# Patient Record
Sex: MALE | Race: WHITE
[De-identification: names, ages, dates, MRNs, and addresses within clinical notes are randomized per-mention and may not be internally consistent; named-entity substitution may affect disease eponyms.]

---

## 2018-06-19 ENCOUNTER — HOSPITAL ENCOUNTER (EMERGENCY)
Dept: HOSPITAL 25 - UCCORT | Age: 33
Discharge: HOME | End: 2018-06-19
Payer: COMMERCIAL

## 2018-06-19 VITALS — SYSTOLIC BLOOD PRESSURE: 127 MMHG | DIASTOLIC BLOOD PRESSURE: 87 MMHG
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
## 2018-06-19 DIAGNOSIS — S62.337A: Primary | ICD-10-CM
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
## 2018-06-19 DIAGNOSIS — Z88.0: ICD-10-CM

## 2018-06-19 DIAGNOSIS — Z88.8: ICD-10-CM
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
## 2018-06-19 PROCEDURE — G0463 HOSPITAL OUTPT CLINIC VISIT: HCPCS

## 2018-06-19 PROCEDURE — 99211 OFF/OP EST MAY X REQ PHY/QHP: CPT

## 2018-06-19 NOTE — RAD
INDICATION: Left hand injury. 



TECHNIQUE: 4 views of the left hand were obtained.



FINDINGS:  There is a transverse fracture of the distal metaphysis of the fifth metacarpal

with anterior angulation of the distal fragment relative to the proximal fragment

consistent with a boxer's fracture. No other fractures are seen.  Joint spaces appear

maintained.



IMPRESSION:  ANGULATED FRACTURE OF THE DISTAL FIFTH METACARPAL.

## 2018-06-19 NOTE — UC
Hand/Wrist HPI





- HPI Summary


HPI Summary: 





left hand pain x 1 days


punched the door this morning 


+ pain and swelling of the left hand 








- History Of Current Complaint


Chief Complaint: UCUpperExtremity


Stated Complaint: LFT HAND INJURY


Time Seen by Provider: 06/19/18 08:40


Hx Obtained From: Patient


Onset/Duration: Sudden Onset, Lasting Hours - 2, Still Present


Severity Initially: Severe


Severity Currently: Moderate


Pain Intensity: 6


Character Of Pain: Aching, Throbbing


Aggravating Factor(s): Movement, Lifting


Alleviating Factor(s): Rest, Ice, OTC Meds - ibuprofen


Associated Signs And Symptoms: Positive: Swelling, Weakness.  Negative: Redness

, Bruising, Fever, Numbness/Tingling





- Allergies/Home Medications


Allergies/Adverse Reactions: 


 Allergies











Allergy/AdvReac Type Severity Reaction Status Date / Time


 


amoxicillin [From Augmentin] Allergy  Rash Verified 06/19/18 08:36


 


clavulanic acid Allergy  Rash Verified 06/19/18 08:36





[From Augmentin]     











Home Medications: 


 Home Medications





Ibuprofen TAB* [Advil TAB*] 600 mg PO Q6H PRN 06/19/18 [History Confirmed 06/19/ 18]











PMH/Surg Hx/FS Hx/Imm Hx


Previously Healthy: Yes





- Surgical History


Surgical History: None





- Family History


Known Family History: Positive: Hypertension, Other - kidney stones





- Social History


Alcohol Use: Occasionally


Substance Use Type: None


Smoking Status (MU): Never Smoked Tobacco





Review of Systems


Constitutional: Negative


Skin: Negative


Eyes: Negative


ENT: Negative


Respiratory: Negative


Is Patient Immunocompromised?: No


All Other Systems Reviewed And Are Negative: Yes





Physical Exam


Triage Information Reviewed: Yes


Appearance: Well-Appearing, No Pain Distress, Well-Nourished


Vital Signs: 


 Initial Vital Signs











Temp  98.1 F   06/19/18 08:26


 


Pulse  61   06/19/18 08:26


 


Resp  14   06/19/18 08:26


 


BP  127/87   06/19/18 08:26


 


Pulse Ox  99   06/19/18 08:26











Vital Signs Reviewed: Yes


Eyes: Positive: Conjunctiva Clear


ENT: Positive: Normal ENT inspection, Hearing grossly normal, Pharynx normal


Neck exam: Normal


Neck: Positive: Supple, Nontender, No Lymphadenopathy


Respiratory: Positive: Chest non-tender, Lungs clear, Normal breath sounds


Cardiovascular: Positive: RRR, No Murmur, Pulses Normal


Musculoskeletal: Positive: Other: - left hand : + swelling , no ecchymosis, + 

tenderness left 5th metacarpal , limited ROM on flexion , limited strength





Diagnostics





- Laboratory


Diagnostic Studies Completed/Ordered: left hand xray:  IMPRESSION: ANGULATED 

FRACTURE OF THE DISTAL FIFTH METACARPAL.





Hand/Wrist Course/Dx





- Differential Dx/Diagnosis


Provider Diagnoses: Boxer fracture left hand





Discharge





- Sign-Out/Discharge


Documenting (check all that apply): Discharge/Admit/Transfer





- Discharge Plan


Condition: Stable


Disposition: HOME


Patient Education Materials:  Boxer Fracture (ED)


Referrals: 


Dre Olivarez MD [Medical Doctor] - As Soon As Possible


No Primary Care Phys,NOPCP [Primary Care Provider] - 





- Billing Disposition and Condition


Condition: STABLE


Disposition: Home

## 2019-01-03 ENCOUNTER — HOSPITAL ENCOUNTER (EMERGENCY)
Dept: HOSPITAL 25 - UCCORT | Age: 34
Discharge: HOME | End: 2019-01-03
Payer: COMMERCIAL

## 2019-01-03 VITALS — SYSTOLIC BLOOD PRESSURE: 121 MMHG | DIASTOLIC BLOOD PRESSURE: 77 MMHG

## 2019-01-03 DIAGNOSIS — M10.9: Primary | ICD-10-CM

## 2019-01-03 DIAGNOSIS — Z88.8: ICD-10-CM

## 2019-01-03 DIAGNOSIS — I10: ICD-10-CM

## 2019-01-03 DIAGNOSIS — Z88.2: ICD-10-CM

## 2019-01-03 PROCEDURE — G0463 HOSPITAL OUTPT CLINIC VISIT: HCPCS

## 2019-01-03 PROCEDURE — 99212 OFFICE O/P EST SF 10 MIN: CPT

## 2019-01-03 NOTE — ED
Lower Extremity





- HPI Summary


HPI Summary: 





33 yr old male with a personal history of gout and family history of gout 

presents here with right foot 1st MP joint swelling, redness and pain.  Onset 3 

days ago.  He has had some indulgence in the holiday food and ETOH.  Complains 

of pain, redness, and symptoms that are moderate.  He feels like this is a 

prior gout flare.  No fever.  he denies trauma or injury. 





- History of Current Complaint


Chief Complaint: UCLowerExtremity


Stated Complaint: RIGHT FOOT COMPLAINT


Time Seen by Provider: 01/03/19 08:52


Pain Intensity: 6





- Allergies/Home Medications


Allergies/Adverse Reactions: 


 Allergies











Allergy/AdvReac Type Severity Reaction Status Date / Time


 


amoxicillin [From Augmentin] Allergy  Rash Verified 01/03/19 08:33


 


clavulanic acid Allergy  Rash Verified 01/03/19 08:33





[From Augmentin]     














PMH/Surg Hx/FS Hx/Imm Hx


Endocrine/Hematology History: 


   Denies: Hx Diabetes, Hx Thyroid Disease


Cardiovascular History: 


   Denies: Hx Hypertension


Respiratory History: 


   Denies: Hx Asthma, Hx Chronic Obstructive Pulmonary Disease (COPD)


GI History: 


   Denies: Hx Ulcer


 History: Reports: Hx Kidney Stones


Infectious Disease History: No


Infectious Disease History: 


   Denies: Hx Hepatitis, Hx Human Immunodeficiency Virus (HIV), Traveled 

Outside the [place] in Last 30 Days





- Family History


Known Family History: Positive: Hypertension, Other - kidney stones





- Social History


Alcohol Use: Rare


Substance Use Type: Reports: None


Smoking Status (MU): Never Smoked Tobacco





Review of Systems


Constitutional: Negative


Positive: Other - pain swelling right first MP


All Other Systems Reviewed And Are Negative: Yes





Physical Exam


Triage Information Reviewed: Yes


Vital Signs On Initial Exam: 


 Initial Vitals











Temp Pulse Resp BP Pulse Ox


 


 97.2 F   78   16   121/77   99 


 


 01/03/19 08:34  01/03/19 08:34  01/03/19 08:34  01/03/19 08:34  01/03/19 08:34











Vital Signs Reviewed: Yes


Appearance: Positive: Well-Appearing, No Pain Distress


Skin: Positive: Warm, Skin Color Reflects Adequate Perfusion


Head/Face: Positive: Normal Head/Face Inspection


Eyes: Positive: EOMI


ENT: Positive: Normal ENT inspection


Neck: Positive: Nontender


Respiratory/Lung Sounds: Positive: Clear to Auscultation, Breath Sounds Present


Cardiovascular: Positive: RRR.  Negative: Pulses are Symmetrical in both Upper 

and Lower Extremities


Abdomen Description: Positive: Nontender


Musculoskeletal: Positive: Other - there is mild edema and obvious tenderness 

to the right 1st MP joint foot.  Slight erythema to the same area.  No deformity

, no bruise.  The pulses are intact in the DP and PT area.


Neurological: Positive: Sensory/Motor Intact, Alert, Oriented to Person Place, 

Time, CN Intact II-III


Psychiatric: Positive: Normal





- [place] Coma Scale


Best Eye Response: 4 - Spontaneous


Best Motor Response: 6 - Obeys Commands


Best Verbal Response: 5 - Oriented


Coma Scale Total: 15





Diagnostics





- Vital Signs


 Vital Signs











  Temp Pulse Resp BP Pulse Ox


 


 01/03/19 08:34  97.2 F  78  16  121/77  99














- Laboratory


Lab Statement: Any lab studies that have been ordered have been reviewed, and 

results considered in the medical decision making process.





Lower Extremity Course/Dx





- Course


Course Of Treatment: 33 yr old with gout flare.  DC home in stable condition on 

indocin.  FU with primary referral.





- Diagnoses


Provider Diagnoses: 


 Gout attack








Discharge





- Sign-Out/Discharge


Documenting (check all that apply): Patient Departure


All imaging exams completed and their final reports reviewed: No Studies





- Discharge Plan


Condition: Good


Disposition: HOME


Prescriptions: 


Indomethacin CAP* [Indocin CAP*] 50 mg PO TID PRN #21 cap


 PRN Reason: Pain


Patient Education Materials:  Gout (ED)


Forms:  *Work Release


Referrals: 


No Primary Care Phys,NOPCP [Primary Care Provider] - 


[facility] PHYSICIAN REFERRAL [Outside] - 2 Days





- Billing Disposition and Condition


Condition: GOOD


Disposition: Home

## 2019-01-07 ENCOUNTER — HOSPITAL ENCOUNTER (EMERGENCY)
Dept: HOSPITAL 25 - UCCORT | Age: 34
Discharge: ANOTHER HOSPITAL | End: 2019-01-07
Payer: COMMERCIAL

## 2019-01-07 VITALS — SYSTOLIC BLOOD PRESSURE: 160 MMHG | DIASTOLIC BLOOD PRESSURE: 77 MMHG

## 2019-01-07 DIAGNOSIS — R42: Primary | ICD-10-CM

## 2019-01-07 DIAGNOSIS — Z88.0: ICD-10-CM

## 2019-01-07 DIAGNOSIS — Z88.8: ICD-10-CM

## 2019-01-07 DIAGNOSIS — R55: ICD-10-CM

## 2019-01-07 PROCEDURE — G0463 HOSPITAL OUTPT CLINIC VISIT: HCPCS

## 2019-01-07 PROCEDURE — 93005 ELECTROCARDIOGRAM TRACING: CPT

## 2019-01-07 PROCEDURE — 99213 OFFICE O/P EST LOW 20 MIN: CPT

## 2019-01-07 NOTE — UC
Dizzy HPI


HPI Summary: 


Onset of dizziness and feeling of presyncope that started today while at work.  

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  Has had a head cold 

for about a month he says but denies any cough, fever, nausea/vomiting.  Is 

currently taking indomethacin for gout.





- History Of Current Complaint


Stated Complaint: DIZZINESS


Time Seen by Provider: 01/07/19 12:16


Hx Obtained From: Patient


Onset/Duration: Sudden Onset, Lasting Hours, Still Present


Timing: Constant


Pain Intensity: 0





- Allergies/Home Medications


Allergies/Adverse Reactions: 


 Allergies











Allergy/AdvReac Type Severity Reaction Status Date / Time


 


amoxicillin [From Augmentin] Allergy  Rash Verified 01/07/19 12:16


 


clavulanic acid Allergy  Rash Verified 01/07/19 12:16





[From Augmentin]     














PMH/Surg Hx/FS Hx/Imm Hx


Previously Healthy: Yes





- Surgical History


Surgical History: None





- Family History


Known Family History: Positive: Hypertension, Other - kidney stones





- Social History


Alcohol Use: Rare


Substance Use Type: None


Smoking Status (MU): Never Smoked Tobacco





Review of Systems


All Other Systems Reviewed And Are Negative: Yes


Constitutional: Positive: Negative


Skin: Positive: Negative


ENT: Positive: Negative


Respiratory: Positive: Negative


Cardiovascular: Positive: Negative


Gastrointestinal: Positive: Negative


Neurological: Positive: Other - DIZZY, PRESYNCOPAL





Physical Exam


Triage Information Reviewed: Yes


Appearance: No Pain Distress, Well-Nourished, Ill-Appearing - PT SEEMS 

LISTLESS. NOT MAKING EYE CONTACT


Vital Signs: 


 Initial Vital Signs











Temp  97.1 F   01/07/19 12:17


 


Pulse  79   01/07/19 12:17


 


Resp  15   01/07/19 12:17


 


BP  156/97   01/07/19 12:17


 


Pulse Ox  99   01/07/19 12:17








 Laboratory Tests











  01/07/19





  12:38


 


Influenza A (Rapid)  Negative


 


Influenza B (Rapid)  Negative











Vital Signs Reviewed: Yes


Eyes: Positive: Conjunctiva Clear


ENT: Positive: Hearing grossly normal, Pharynx normal, TMs normal


Neck: Positive: Supple, Nontender, No Lymphadenopathy


Respiratory Exam: Normal


Cardiovascular Exam: Normal


Abdomen Description: Positive: Nontender, Soft


Bowel Sounds: Positive: Present


Musculoskeletal: Positive: No Edema


Neurological: Positive: Alert


Psychological: Positive: Age Appropriate Behavior, Other: - FLAT AFFECT


Skin: Negative: Rashes





Diagnostics





- EKG


Cardiac Rate: NL - 75BPM


Cardiac Rhythm: Sinus: Normal - SINUS ARRHYTHMIA


Ectopy: None


ST Segment: Normal





Dizzy Course/Dx





- Course


Course Of Treatment: FLU NEG. EKG UNREMARKABLE. PT WITH PERSISTENT DIZZINESS 

AND FEELING OF PRESYNCOPE. WILL SEND TO [facility] ED BY AMBULANCE.





- Differential Dx/Diagnosis


Provider Diagnosis: 


 Dizzy, Pre-syncope








- Physician Notifications


Discussed Patient Care With: Chacha Iqbal


Time Discussed With Above Provider: 12:50


Instructed by Provider To: MD Will See In ED





Discharge





- Sign-Out/Discharge


Documenting (check all that apply): Patient Departure


All imaging exams completed and their final reports reviewed: No Studies





- Discharge Plan


Condition: Stable


Disposition: TRANS HIGHER LVL OF CARE FAC


Referrals: 


No Primary Care Phys,NOPCP [Primary Care Provider] - 





- Billing Disposition and Condition


Condition: STABLE


Disposition: Trans Higher Lvl of Care Fac

## 2019-09-19 ENCOUNTER — HOSPITAL ENCOUNTER (EMERGENCY)
Dept: HOSPITAL 25 - UCCORT | Age: 34
Discharge: HOME | End: 2019-09-19
Payer: COMMERCIAL

## 2019-09-19 VITALS — DIASTOLIC BLOOD PRESSURE: 72 MMHG | SYSTOLIC BLOOD PRESSURE: 127 MMHG

## 2019-09-19 DIAGNOSIS — Z88.0: ICD-10-CM

## 2019-09-19 DIAGNOSIS — H66.92: Primary | ICD-10-CM

## 2019-09-19 PROCEDURE — G0463 HOSPITAL OUTPT CLINIC VISIT: HCPCS

## 2019-09-19 PROCEDURE — 99212 OFFICE O/P EST SF 10 MIN: CPT

## 2019-09-19 NOTE — XMS REPORT
Continuity of Care Document (CCD)

 Created on:2019



Patient:Ivan Hicks

Sex:Male

:1985

External Reference #:[id number]





Demographics







 Address  [address] [place] [address]

 

 Home Phone  [phone]

 

 Mobile Phone  [phone]

 

 Email Address  [email or web address]

 

 Preferred Language  en

 

 Marital Status  Not  or 

 

 Restorationism Affiliation  Unknown

 

 Race  White

 

 Ethnic Group  Not  or 









Author







 Name  Donovan Garcia M.D.

 

 Address  [address]  [address]



   [place] [address]









Care Team Providers







 Name  Role  Phone

 

 Heidy Chowdhury MD - Internal Medicine  Care Team Information   [phone]
[phone]









Problems







 Active Problems  Provider  Date

 

 Abdominal pain  Eddie Prince MD,FACS  Onset: 2019

 

 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 

 

 External hemorrhoids  Eddie Prince MD,FACS  Onset: 2019

 

 Kidney stone  Donovan Garcia M.D.  Onset: 2019

 

 Acquired renal cystic disease  Donovan Garcia M.D.  Onset: 2019







Social History







 Type  Date  Description  Comments

 

 Birth Sex    Unknown  

 

 Tobacco Use  Start: Unknown End:  Quit  



       

 

 Smoking Status  Reviewed: 19  Quit  

 

 ETOH Use    Currently consumes alcohol  



     socially  

 

 Tobacco Use  Start: Unknown End:  Patient is a former smoker  



   Unknown    

 

 Recreational Drug Use    Denies Drug Use  

 

 Exercise Type/Frequency    Exercises regularly  







Allergies, Adverse Reactions, Alerts







 Active Allergies  Reaction  Severity  Comments  Date

 

 Augmentin        2017

 

 Indomethacin        2019







Medications







 Active Medications  SIG  Qnty  Indications  Ordering Provider  Date

 

 Ibuprofen  take 2 tablets by      Unknown  



        200mg Tablets  mouth as needed        



   for back pain, up        



   to 4 times per        



   day        









 History Medications









 No Active Medications        Unknown  2019 - 2019







Immunizations







 Description

 

 No Information Available







Vital Signs







 Date  Vital  Result  Comment

 

 2019  2:41pm  BP Systolic Sitting Right Arm  136 mmHg  









 BP Diastolic Sitting Right Arm  88 mmHg  

 

 Body Temperature  94.7 F  

 

 Heart Rate  78 /min  

 

 Respiratory Rate  16 /min  

 

 Height  64 inches  5'4"

 

 Weight  211.00 lb  

 

 BMI (Body Mass Index)  36.2 kg/m2  

 

 BSA (Body Surface Area)  2.00 m2  

 

 Ideal body weight in kilograms  59 kg  

 

 O2 % BldC Oximetry  100 %  









 2019 10:28am  BP Systolic  101 mmHg  









 BP Diastolic  69 mmHg  

 

 Heart Rate  68 /min  

 

 Respiratory Rate  16 /min  

 

 Height  64 inches  5'4"

 

 Weight  208.00 lb  

 

 BMI (Body Mass Index)  35.7 kg/m2  

 

 BSA (Body Surface Area)  1.99 m2  

 

 Ideal body weight in kilograms  59 kg  

 

 O2 % BldC Oximetry  97 %  

 

 Pain Level  0  







Results







 Test  Date  Facility  Test  Result  H/L  Range  Note

 

 Basic Metabolic  2019  [facility]  Glucose  92 mg/dL  Normal    1



 Panel    [address] [place] [address] [phone]          









 BUN  12 mg/dL  Normal  7-18  

 

 Creatinine  0.9 mg/dL  Normal  0.6-1.3  

 

 Glom Filtration Rate, Estimate  >60 mL/min    >60  

 

 If African American  >60 mL/min    >60  2

 

 BUN/Creat  13.3 ratio      

 

 Sodium  140 mmol/L  Normal  136-145  

 

 Potassium  3.7 mmol/L  Normal  3.5-5.1  

 

 Chloride  107 mmol/L  Normal    

 

 Carbon Dioxide  26 mmol/L  Normal  21-32  

 

 Anion Gap  7 mEq/L  Low  8-16  

 

 Calcium  8.9 mg/dL  Normal  8.5-10.1  









 Comprehensive  2019  [facility]  Glucose  84 mg/dL  Normal    3



 Metabolic Panel    [address] [place] [address] [phone]          









 BUN  13 mg/dL  Normal  7-18  

 

 Creatinine  0.9 mg/dL  Normal  0.6-1.3  

 

 Glom Filtration Rate, Estimate  >60 mL/min    >60  

 

 If African American  >60 mL/min    >60  4

 

 BUN/Creat  14.4 ratio      

 

 Sodium  139 mmol/L  Normal  136-145  

 

 Potassium  4.0 mmol/L  Normal  3.5-5.1  

 

 Chloride  107 mmol/L  Normal    

 

 Carbon Dioxide  28 mmol/L  Normal  21-32  

 

 Anion Gap  4 mEq/L  Low  8-16  

 

 Calcium  10.0 mg/dL  Normal  8.5-10.1  

 

 Total Protein  8.8 g/dL  High  6.4-8.2  

 

 Albumin  4.9 g/dL  Normal  3.4-5.0  

 

 Globulin  3.9 g/dL  Normal  1.9-4.3  

 

 Alb/Glob  1.3 ratio      

 

 Bilirubin,Total  0.8 mg/dL  Normal  0.2-1.0  

 

 Sgot/Ast  33 U/L  Normal  15-37  

 

 SGPT/Alt  53 U/L  Normal  12-78  

 

 Alkaline Phosphatase  65 U/L  Normal    









 CBC W/Automated  2019  [facility]  White Blood  6.1 K/uL  Normal  3.4-10.5  



 Diff    [address]  Count        



     [place] [address] [phone]          









 Red Blood Count  5.82 M/uL  High  4.20-5.80  

 

 Hemoglobin  17.0 gm/dL  Normal  12.8-17.0  

 

 Hematocrit  51.0 %  High  38.0-48.0  

 

 Mean Cell Volume  87.6 fl  Normal  80.0-96.0  

 

 Mean Corpuscular HGB  29.2 pg  Normal  27.0-33.0  

 

 Mean Corpuscular HGB Conc  33.3 g/dL  Normal  31.7-36.0  

 

 Platelet Count  391 K/uL  High  155-360  

 

 Red Cell Distri Width SD  40.8 fl  Normal  36-51  

 

 Red Cell Distri Width %CV  12.7 %  Normal  11.6-15.8  

 

 Mean Platelet Volume  9.5 fl  Normal  6.6-10.6  

 

 Neut%  52.0 %  Normal  33.0-73.0  

 

 Lymph %  33.1 %  Normal  20.0-42.0  

 

 Mono %  12.7 %  High  0.0-10.0  

 

 Eo%  1.5 %  Normal  0.0-6.6  

 

 Bas%  0.5 %  Normal  0.0-1.1  

 

 Immature Grans  0.2 %  Normal  0.0-5.0  

 

 NRBC %  0.0 /100WBC    < 10/ 100 WBC  

 

 Neut#  3.20 K/uL  Normal  1.8-7.0  

 

 Lymph #  2.03 K/uL  Normal  1.0-4.0  

 

 Mono #  0.78 K/uL  Normal  0.0-0.8  

 

 Eos #  0.09 K/uL  Normal  0.0-0.5  

 

 Baso #  0.03 K/uL  Normal  0.0-0.1  

 

 Immature Grans Absolute  0.01 K/uL      

 

 NRBC #  0.00 K/uL      









 Laboratory test finding  2019  [facility]  Lipase  137 U/L  Normal    



     [address] [place] [address]          



     [phone]          









 1  N20.0 D41.01

 

 2  Note:



   Persistent reduction for 3 months or more in an eGFR <60



   mL/min/1.73 m2 defines CKD.  Patients with eGFR values >/=60



   mL/min/1.73 m2 may also have CKD if evidence of persistent



   proteinuria is present.



   



   The original MDRD equation for estimated GFR is not valid



   for patients less than 18 years of age.



   



   Additional information may be found at www.kdoqi.org.

 

 3  UNSPEC ABD PAIN R10.9

 

 4  Note:



   Persistent reduction for 3 months or more in an eGFR <60



   mL/min/1.73 m2 defines CKD.  Patients with eGFR values >/=60



   mL/min/1.73 m2 may also have CKD if evidence of persistent



   proteinuria is present.



   



   The original MDRD equation for estimated GFR is not valid



   for patients less than 18 years of age.



   



   Additional information may be found at www.kdoqi.org.







Procedures







 Date  Code  Description  Status

 

 2019  05467  Enucleation or excision external thrombotic hemorrhoid  
Completed







Medical Devices







 Description

 

 No Information Available







Encounters







 Type  Date  Location  Provider  Dx  Diagnosis

 

 Office Visit  2019  2:45p  Urology  Donovan Garcia M.D.  N20.0  
Calculus of kidney









 N28.1  Cyst of kidney, acquired









 Office Visit  2019 10:00a  Urology  Jose  N20.0  Calculus of kidney



       BENJI Man    

 

 Office Visit  2019  8:30a  Surgical Office  Eddie Prince  R10.Ya  
Unspecified



       TOM FLETCHER    abdominal pain









 K64.5  Perianal venous thrombosis

 

 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









 Office Visit  2019  3:15p  Surgical Office  MONIQUE Prince0.Ya  Unspecified



       Eddie    abdominal pain



       TOM FLETCHER    







Assessments







 Date  Code  Description  Provider

 

 2019  N20.0  Calculus of kidney  Donovan Garcia M.D.

 

 2019  N28.1  Cyst of kidney, acquired  Donovan Garcia M.D.

 

 2019  N20.0  Calculus of kidney  Donovan Garcia M.D.

 

 2019  Z48.89  Encounter for other specified surgical  Eddie Prince MD,
FACS



     aftercare  

 

 2019  K64.5  Perianal venous thrombosis  Eddie Prince MD,FACS

 

 2019  K64.5  Perianal venous thrombosis  Eddie Prince MD,FACS

 

 2019  R10.9  Unspecified abdominal pain  Eddie Prince MD,FACS

 

 2019  K64.5  Perianal venous thrombosis  Eddie Prince MD,FACS

 

 2019  D41.01  Neoplasm of uncertain behavior of right  Eddie Prince MD
,TOM



     kidney  

 

 2019  R10.9  Unspecified abdominal pain  Eddie Prince MD,FACS







Plan of Treatment

Future Appointment(s):08/10/2020  3:00 pm - Donovan Garcia M.D. at [id number] - Donovan Garcia M.D.N20.0 Calculus of kidneyNew Xrays:Ultrasound, 
Renal &amp; Bladder, Ordered: 19Comments:Today we reviewed the recent 
litholink 24-hour urine studies, the recommendations listed below were 
discussed with the patient in detail and all questions were answered.  General 
recommendations for kidney stone prevention was reviewed- Maintain urine volume
- Hyperoxaluria: Give him education materialon high oxalate food. - 
Hyperuricosuria : Decrease meat products.  Patient to follow up with me in 
1year with [id number] Cyst of kidney, acquiredComments:This is benign and 
does not require follow-up.  Patient was reassured.



Functional Status







 Description

 

 No Information Available







Mental Status







 Description

 

 No Information Available







Referrals







 Refer to   Reason for Referral  Status  Appt Date

 

 Donovan Garcia M.D.  1.5 CM LESION NOTED IN THE UPPER POLE OF THE  Closed  



   RIGHT KIDNEY POSSIBLY SOLID RENAL MASS PER    



   RECENT CT    









 [address], [place] [address]

 

 [phone]

## 2019-09-24 ENCOUNTER — HOSPITAL ENCOUNTER (EMERGENCY)
Dept: HOSPITAL 25 - ED | Age: 34
Discharge: HOME | End: 2019-09-24
Payer: COMMERCIAL

## 2019-09-24 VITALS — SYSTOLIC BLOOD PRESSURE: 135 MMHG | DIASTOLIC BLOOD PRESSURE: 86 MMHG

## 2019-09-24 DIAGNOSIS — H60.90: Primary | ICD-10-CM

## 2019-09-24 DIAGNOSIS — Z88.1: ICD-10-CM

## 2019-09-24 DIAGNOSIS — Z88.6: ICD-10-CM

## 2019-09-24 PROCEDURE — 99282 EMERGENCY DEPT VISIT SF MDM: CPT

## 2019-09-24 NOTE — ED
Throat Pain/Nasal Congestion





- HPI Summary


HPI Summary: 


This patient is a 34 year old M presenting to [facility] with a chief complaint of 

ear pain since 9/23/19. Patient states he was seen at  and prescribed 

antibiotics with no relief. Patient states that pain in ear increased as a 

result he came to the ED. The patient rates the pain 5/10 in severity. Symptoms 

aggravated by nothing. Symptoms alleviated by nothing. Patient reports ear pain

, pharyngitis and jaw pain. Patient denies fever, chills, nausea and vomiting. 

Medications reviewed. Allergies noted.


 Allergies











Allergy/AdvReac Type Severity Reaction Status Date / Time


 


indomethacin Allergy Severe suicidal Verified 09/19/19 12:51





   thoughts  


 


amoxicillin [From Augmentin] Allergy  Rash Verified 09/19/19 12:51


 


clavulanic acid Allergy  Rash Verified 09/19/19 12:51





[From Augmentin]     








 Home Medications











 Medication  Instructions  Recorded  Confirmed  Type


 


Ibuprofen TAB* [Advil TAB*] 600 mg PO Q6H PRN 06/19/18 09/24/19 History


 


traMADol TAB* [Ultram*] 50 mg PO Q12H PRN #10 tab MDD 2 09/24/19  Rx





 tablets   




















- History of Current Complaint


Chief Complaint: EDEarPain


Time Seen by Provider: 09/24/19 18:35


Hx Obtained From: Patient


Onset/Duration: Gradual Onset, Lasting Days - 9/23/19


Severity: Mild


Associated Signs And Symptoms: Positive: Negative


Cough: None





- Allergies/Home Medications


Allergies/Adverse Reactions: 


 Allergies











Allergy/AdvReac Type Severity Reaction Status Date / Time


 


indomethacin Allergy Severe suicidal Verified 09/19/19 12:51





   thoughts  


 


amoxicillin [From Augmentin] Allergy  Rash Verified 09/19/19 12:51


 


clavulanic acid Allergy  Rash Verified 09/19/19 12:51





[From Augmentin]     














PMH/Surg Hx/FS Hx/Imm Hx


Endocrine/Hematology History: 


   Denies: Hx Diabetes, Hx Thyroid Disease


Cardiovascular History: 


   Denies: Hx Hypertension


Respiratory History: 


   Denies: Hx Asthma, Hx Chronic Obstructive Pulmonary Disease (COPD)


GI History: 


   Denies: Hx Ulcer


 History: Reports: Hx Kidney Stones





- Surgical History


Surgery Procedure, Year, and Place: HEMMORHOIDECTOMY


Infectious Disease History: No


Infectious Disease History: 


   Denies: Hx Hepatitis, Hx Human Immunodeficiency Virus (HIV), Traveled 

Outside the [place] in Last 30 Days





- Family History


Known Family History: Positive: Hypertension, Other - kidney stones





- Social History


Alcohol Use: None


Substance Use Type: Reports: None


Hx Tobacco Use: No


Smoking Status (MU): Never Smoked Tobacco


Have You Smoked in the Last Year: No





Review of Systems


Negative: Fever, Chills


Positive: Ear Ache, Other - jaw pain and pharyngitis


Negative: Vomiting, Nausea


All Other Systems Reviewed And Are Negative: Yes





Physical Exam





- Summary


Physical Exam Summary: 


Constitutional: Well-developed, Well-nourished, Alert. (-) Distressed


Skin: Warm, Dry


HENT: Normocephalic; Atraumatic, no Pharyngeal erythema, no surrounding  

erythema, pain with ear manipulation, drainage in external ear canal, patient 

able to open and close mouth, no sublingual or dub mandible swelling


Eyes: Conjunctiva normal


Neck: Musculoskeletal ROM normal neck. (-) JVD, (-) Stridor, (-) Tracheal 

deviation


Cardio: Rhythm regular, rate normal, Heart sounds normal; Intact distal pulses;

. Radial pulses are 2+ and symmetric. (-) Murmur


Pulmonary/Chest wall: Effort normal. (-) Respiratory distress, (-) Wheezes, (-) 

Rales


Abd: Soft, (-) tenderness, (-) Distension, (-) Guarding, (-) Rebound


Musculoskeletal: (-) Edema


Lymph: (-) Cervical adenopathy


Neuro: Alert, Oriented x3


Psych: Mood and affect Normal








Triage Information Reviewed: Yes


Vital Signs On Initial Exam: 


 Initial Vitals











Temp Pulse Resp BP Pulse Ox


 


 98.3 F   63   17   157/99   99 


 


 09/24/19 16:18  09/24/19 16:18  09/24/19 16:18  09/24/19 16:18  09/24/19 16:18











Vital Signs Reviewed: Yes





Diagnostics





- Vital Signs


 Vital Signs











  Temp Pulse Resp BP Pulse Ox


 


 09/24/19 16:18  98.3 F  63  17  157/99  99














- Laboratory


Lab Statement: Any lab studies that have been ordered have been reviewed, and 

results considered in the medical decision making process.





EENT Course/Dx





- Course


Course Of Treatment: Patient is here with signs consistent with otitis externa.

  She is currently on antibiotics for sinus a lattice of his ear which was not 

present on exam today.  Patient did have pain with pinna manipulation and some 

discharge in his external ear.  Patient's guardian Ciprodex drops.  Patient's 

main concern was his pain which was untreated.  Patient started on a short 

course of tramadol





- Diagnoses


Provider Diagnoses: 


 Otitis externa








Discharge ED





- Sign-Out/Discharge


Documenting (check all that apply): Patient Departure - discharge


Patient Received Moderate/Deep Sedation with Procedure: No





- Discharge Plan


Condition: Stable


Disposition: HOME


Prescriptions: 


traMADol TAB* [Ultram*] 50 mg PO Q12H PRN #10 tab MDD 2 tablets


 PRN Reason: Pain - Severe


Patient Education Materials:  Otitis Externa (ED)


Referrals: 


[facility] [place] [Outside] - 3 Days


Additional Instructions: 


PLEASE RETURN TO EMERGENCY DEPARTMENT FOR ANY NEW OR WORSENING SYMPTOMS. FOLLOW 

UP WITH YOUR PRIMARY CARE PHYSICIAN IN 1-3 DAYS. PLEASE CONTINUE ANTIBIOTICS AS 

WELL AS EAR DROPS AND TAKE PAIN MEDICATION AS PRESCRIBED. 





- Billing Disposition and Condition


Condition: STABLE


Disposition: Home





- Attestation Statements


Document Initiated by Erwinibe: Yes


Documenting Scribe: Cristela West 


Provider For Whom Erwinibe is Documenting (Include Credential): Dr. Robson Rojas MD


Scribe Attestation: 


Cristela NASH scribed for Dr. Robson Rojas MD on 09/27/19 at 0745. 


Scribe Documentation Reviewed: Yes


Provider Attestation: 


The documentation as recorded by the Cristela slade  accurately reflects 

the service I personally performed and the decisions made by me, Dr. Robson Rojas MD


Status of Scribe Document: Viewed

## 2019-11-14 ENCOUNTER — HOSPITAL ENCOUNTER (EMERGENCY)
Dept: HOSPITAL 25 - UCCORT | Age: 34
Discharge: HOME | End: 2019-11-14
Payer: COMMERCIAL

## 2019-11-14 VITALS — SYSTOLIC BLOOD PRESSURE: 122 MMHG | DIASTOLIC BLOOD PRESSURE: 69 MMHG

## 2019-11-14 DIAGNOSIS — R09.81: ICD-10-CM

## 2019-11-14 DIAGNOSIS — J40: Primary | ICD-10-CM

## 2019-11-14 DIAGNOSIS — Z88.0: ICD-10-CM

## 2019-11-14 DIAGNOSIS — Z88.8: ICD-10-CM

## 2019-11-14 PROCEDURE — 99212 OFFICE O/P EST SF 10 MIN: CPT

## 2019-11-14 PROCEDURE — G0463 HOSPITAL OUTPT CLINIC VISIT: HCPCS

## 2019-11-14 NOTE — UC
Respiratory Complaint HPI





- HPI Summary


HPI Summary: 





Pt presents with c/o cough, SOB, wheezing, chills, body aches X 2 weeks.  Pt 

saw PCP and referred to pulmonologist and had pft today. 





- History of Current Complaint


Chief Complaint: UCRespiratory


Stated Complaint: COUGH & CONGESTION


Time Seen by Provider: 11/14/19 19:18


Hx Obtained From: Patient


Onset/Duration: Sudden Onset, Lasting Weeks, Still Present


Timing: Constant


Severity Initially: Mild


Severity Currently: Moderate


Pain Intensity: 4


Aggravating Factors: Exertion, Deep Breaths, Recumbent Position


Alleviating Factors: Nothing


Associated Signs And Symptoms: Positive: Fever, Chills, URI, Nasal Congestion





- Risk Factors


Pulmonary Embolism Risk Factors: Negative


Cardiac Risk Factors: Negative


Pseudomonas Risk Factors: Negative


Tuberculosis Risk Factors: Negative





- Allergies/Home Medications


Allergies/Adverse Reactions: 


 Allergies











Allergy/AdvReac Type Severity Reaction Status Date / Time


 


indomethacin Allergy Severe suicidal Verified 11/14/19 19:01





   thoughts  


 


amoxicillin [From Augmentin] Allergy  Rash Verified 11/14/19 19:01


 


clavulanic acid Allergy  Rash Verified 11/14/19 19:01





[From Augmentin]     











Home Medications: 


 Home Medications





Albuterol HFA INHALER* [Ventolin HFA Inhaler*] 2 puff INH Q4H PRN 11/14/19 [

History Confirmed 11/14/19]











PMH/Surg Hx/FS Hx/Imm Hx


Previously Healthy: Yes





- Surgical History


Surgical History: Yes


Surgery Procedure, Year, and Place: HEMMORHOIDECTOMY





- Family History


Known Family History: Positive: Hypertension, Other - kidney stones





- Social History


Occupation: Employed Full-time


Lives: With Family


Alcohol Use: None


Substance Use Type: None


Smoking Status (MU): Never Smoked Tobacco


Have You Smoked in the Last Year: No





- Immunization History


Vaccination Up to Date: Yes





Review of Systems


All Other Systems Reviewed And Are Negative: Yes


Constitutional: Positive: Fever, Chills, Fatigue


Skin: Positive: Negative


Eyes: Positive: Negative


ENT: Positive: Sinus Congestion


Respiratory: Positive: Shortness Of Breath, Cough


Cardiovascular: Positive: Negative


Gastrointestinal: Positive: Negative


Genitourinary: Positive: Negative


Motor: Positive: Negative


Neurovascular: Positive: Negative


Musculoskeletal: Positive: Myalgia


Neurological: Positive: Negative


Psychological: Positive: Negative


Is Patient Immunocompromised?: No





Physical Exam


Triage Information Reviewed: Yes


Appearance: Ill-Appearing


Vital Signs: 


 Initial Vital Signs











Temp  100.8 F   11/14/19 18:53


 


Pulse  91   11/14/19 18:53


 


Resp  16   11/14/19 18:53


 


BP  122/69   11/14/19 18:53


 


Pulse Ox  99   11/14/19 18:53











Vital Signs Reviewed: Yes


Eye Exam: Normal


ENT: Positive: Nasal congestion


Dental Exam: Normal


Neck exam: Normal


Respiratory: Positive: Wheezing


Cardiovascular Exam: Normal


Musculoskeletal Exam: Normal


Neurological Exam: Normal


Psychological Exam: Normal


Skin Exam: Normal





Respiratory Course/Dx





- Differential Dx/Diagnosis


Differential Diagnosis/HQI/PQRI: Bronchitis, Exacerbation Of COPD


Provider Diagnosis: 


 Bronchitis








Discharge ED





- Sign-Out/Discharge


Documenting (check all that apply): Patient Departure


All imaging exams completed and their final reports reviewed: No Studies





- Discharge Plan


Condition: Stable


Disposition: HOME


Prescriptions: 


Azithromycin TAB* [Zithromax TAB (Z-HENRRY) 250 mg #6 tabs] 2 tab PO .TODAY, THEN 

1 DAILY #1 henrry


Benzonatate CAP* [Tessalon 100 MG CAP*] 100 mg PO Q8H PRN #30 cap


 PRN Reason: Cough


Cetirizine* [ZyrTEC 10 MG TAB*] 10 mg PO DAILY #14 tab


Montelukast Sodium TAB* [Singulair TAB*] 10 mg PO BEDTIME #14 tab


predniSONE TAB* [Deltasone 10 MG TAB*] 30 mg PO DAILY #12 tab


Patient Education Materials:  Acute Cough (ED)


Referrals: 


ROMAN Mayes PA [Primary Care Provider] - As Soon As Possible


Grant Guzman MD [Medical Doctor] - As Soon As Possible





- Billing Disposition and Condition


Condition: STABLE


Disposition: Home